# Patient Record
Sex: MALE | Race: WHITE | NOT HISPANIC OR LATINO | Employment: STUDENT | URBAN - NONMETROPOLITAN AREA
[De-identification: names, ages, dates, MRNs, and addresses within clinical notes are randomized per-mention and may not be internally consistent; named-entity substitution may affect disease eponyms.]

---
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
## 2025-01-02 NOTE — TELEPHONE ENCOUNTER
Caller: AMY PARTIDA    Relationship: Mother    Best call back number: [phone]    Medication needed:   Requested Prescriptions     Pending Prescriptions Disp Refills   • methylphenidate (Ritalin) 10 MG tablet 90 tablet 0     Sig: Take 1 tablet by mouth Daily.   • methylphenidate (Concerta) 36 MG CR tablet 180 tablet 0     Sig: Take 2 tablets by mouth Every Morning for 90 days       When do you need the refill by: ASAP    What additional details did the patient provide when requesting the medication: PATIENT WILL BE OUT ON SUNDAY  Does the patient have less than a 3 day supply:  [x] Yes  [] No    What is the patient's preferred pharmacy: [id number] [place] [facility] [address] AT [facility] OF [address]  [phone] [facility] [phone] FX                 
PATIENT'S MOM SAID PRESCRIPTION SHOULD BE WRITTEN FOR 30 DAYS     PATIENT TAKES 2 CONCERTA TABLETS PER DAY          
fever

## 2025-04-07 ENCOUNTER — TELEPHONE (OUTPATIENT)
Dept: PEDIATRICS | Facility: CLINIC | Age: 25
End: 2025-04-07
Payer: COMMERCIAL

## 2025-04-07 NOTE — TELEPHONE ENCOUNTER
Caller: AMY PARTIDA    Relationship: Mother    Best call back number:     [phone]       What form or medical record are you requesting: ALL IMMUNIZATION RECORD    Who is requesting this form or medical record from you: [facility] & [facility]     How would you like to receive the form or medical records (pick-up, mail, fax): MAIL   If mail, what is the address: [address] [place] [id number]     Timeframe paperwork needed: ASAP    Additional notes: NEEDS FOR [facility]